# Patient Record
Sex: FEMALE | NOT HISPANIC OR LATINO | URBAN - METROPOLITAN AREA
[De-identification: names, ages, dates, MRNs, and addresses within clinical notes are randomized per-mention and may not be internally consistent; named-entity substitution may affect disease eponyms.]

---

## 2021-11-24 ENCOUNTER — EMERGENCY (EMERGENCY)
Facility: HOSPITAL | Age: 56
LOS: 1 days | Discharge: HOME | End: 2021-11-24
Attending: EMERGENCY MEDICINE | Admitting: EMERGENCY MEDICINE
Payer: COMMERCIAL

## 2021-11-24 VITALS
TEMPERATURE: 99 F | HEIGHT: 66 IN | WEIGHT: 184.97 LBS | HEART RATE: 91 BPM | RESPIRATION RATE: 18 BRPM | DIASTOLIC BLOOD PRESSURE: 103 MMHG | OXYGEN SATURATION: 98 % | SYSTOLIC BLOOD PRESSURE: 144 MMHG
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
## 2021-11-24 DIAGNOSIS — S09.90XA UNSPECIFIED INJURY OF HEAD, INITIAL ENCOUNTER: ICD-10-CM

## 2021-11-24 DIAGNOSIS — M48.03 SPINAL STENOSIS, CERVICOTHORACIC REGION: ICD-10-CM
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
## 2021-11-24 PROCEDURE — 99284 EMERGENCY DEPT VISIT MOD MDM: CPT | Mod: 25

## 2021-11-24 PROCEDURE — 99285 EMERGENCY DEPT VISIT HI MDM: CPT

## 2021-11-24 PROCEDURE — 70450 CT HEAD/BRAIN W/O DYE: CPT | Mod: MG

## 2021-11-24 PROCEDURE — 70450 CT HEAD/BRAIN W/O DYE: CPT | Mod: 26,MG

## 2021-11-24 PROCEDURE — G1004: CPT

## 2021-11-24 PROCEDURE — 72125 CT NECK SPINE W/O DYE: CPT | Mod: 26,MG

## 2021-11-24 PROCEDURE — 72125 CT NECK SPINE W/O DYE: CPT | Mod: MG

## 2021-11-24 NOTE — ED PROVIDER NOTE - ATTENDING CONTRIBUTION TO CARE
55 y/o F, presents to ED c/o headache, clogged ears and occasional double vision x 2 wks. States that symptoms started after mechanical fall (was seen at another hosp and dx'd w/L wrist fx, but states that no imaging of her head was done). Pain is "pressure" like, has not taken any pain meds, feels like L ear is clogged, occasional double vision out of r eye, no acute changes in any symptoms today. Denies loc at time of fall, n/v, visual loss, hearing loss, neck pain, gait disturbances, blood thinners use. Pt AAO, NAD, non focal neuro exam, well appearing. CT head/c spine done and neg, pt reassured, stable for dc. Strict return precautions given.

## 2021-11-24 NOTE — ED PROVIDER NOTE - MUSCULOSKELETAL, MLM
LVM to call office Spine appears normal, range of motion is not limited, no muscle or joint tenderness; L UE w/short wrist cast in place, sling in place

## 2021-11-24 NOTE — ED PROVIDER NOTE - OBJECTIVE STATEMENT
The pt is a 57 y/o F, who presents to ED c/o h/a, clogged ears and occasional double vision x 2 wks. Pt states that symptoms started after mechanical fall (was seen at another hosp and dx'd w/L wrist fx, but states that no imaging of her head was done). pain is "pressure" like, has not taken any pain meds, feels like L ear is clogged, occasional double vision out of r eye, no acute changes in any symptoms today. Denies loc at time of fall, n/v, visual loss, hearing loss, neck pain, gait disturbances, blood thinners use

## 2021-11-24 NOTE — ED ADULT TRIAGE NOTE - CHIEF COMPLAINT QUOTE
Pt presents to ED w/ c/o of Increased Ha, blurry vision, loss of hearing x 3 days. Pt states fell x 2 weeks ago and was evaluated in an ER but did not receive a head CT. As per friend, symptoms have progressively worsened.

## 2021-11-24 NOTE — ED PROVIDER NOTE - PATIENT PORTAL LINK FT
You can access the FollowMyHealth Patient Portal offered by [facility] by registering at the following website: [email or web address]. By joining [facility]’s FollowMyHealth portal, you will also be able to view your health information using other applications (apps) compatible with our system.
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
## 2021-11-24 NOTE — ED PROVIDER NOTE - ENMT, MLM
Airway patent, Nasal mucosa clear. Mouth with normal mucosa. Throat has no vesicles, no oropharyngeal exudates and uvula is midline. Ears: no hemotympanum b/l

## 2021-11-24 NOTE — ED ADULT NURSE NOTE - OBJECTIVE STATEMENT
A&OX4. PERRLA. AMBULATORY WITH STEADY GAIT. SKIN WARM AND DRY. BREATHING EVEN AND UNLABORED. SPEAKING IN CLEAR AND COMPLETE SENTENCES.

## 2021-11-24 NOTE — ED PROVIDER NOTE - CLINICAL SUMMARY MEDICAL DECISION MAKING FREE TEXT BOX
pt s/p mechanical fall 2 wks ago, c/o h/a w/some clogged hearing and double vision at times - asymptomatic at this time, non focal neuro exam, no blood thinners use, no loc at time of fall, well appearing, do not suspect bleed but ct head/c spine done and pt s/p mechanical fall 2 wks ago, c/o h/a w/some clogged hearing and double vision at times - asymptomatic at this time, non focal neuro exam, no blood thinners use, no loc at time of fall, well appearing, do not suspect bleed but ct head/c spine done and neg, pt re assured, stable for dc, head injury precautions/ concussion discussed, pt understands and agrees w/plan, strict return precautions given